# Patient Record
Sex: FEMALE
[De-identification: names, ages, dates, MRNs, and addresses within clinical notes are randomized per-mention and may not be internally consistent; named-entity substitution may affect disease eponyms.]

---

## 2020-12-10 ENCOUNTER — RESULTS ONLY (OUTPATIENT)
Dept: LAB | Age: 30
End: 2020-12-10

## 2020-12-10 ENCOUNTER — OFFICE VISIT (OUTPATIENT)
Dept: NEUROLOGY | Facility: CLINIC | Age: 30
End: 2020-12-10

## 2020-12-10 DIAGNOSIS — Z00.6 EXAMINATION OF PARTICIPANT OR CONTROL IN CLINICAL RESEARCH: Primary | ICD-10-CM

## 2020-12-10 DIAGNOSIS — G71.02 FSHD (FACIOSCAPULOHUMERAL MUSCULAR DYSTROPHY) (H): ICD-10-CM

## 2020-12-10 LAB
APTT PPP: 30 SEC (ref 22–37)
APTT PPP: NORMAL SEC (ref 22–37)
APTT PPP: NORMAL SEC (ref 22–37)
INR PPP: 0.95 (ref 0.86–1.14)
INR PPP: NORMAL (ref 0.86–1.14)
INR PPP: NORMAL (ref 0.86–1.14)
PLATELET # BLD AUTO: 224 10E9/L (ref 150–450)
PLATELET # BLD AUTO: NORMAL 10E9/L (ref 150–450)

## 2020-12-10 PROCEDURE — 99207 PR NO CHARGE-RESEARCH SERVICE: CPT | Performed by: PSYCHIATRY & NEUROLOGY

## 2020-12-10 NOTE — LETTER
12/10/2020       RE: Marni Bell  [address] [facility] [id number]     Dear Colleague,    Thank you for referring your patient, Marni Bell, to the [facility] [place] at [facility]. Please see a copy of my visit note below.    Research Visit.     Muscle biopsy procedure note.     Safety labs   PTT normal  INR - normal  Platelets normal    After informed consent was obtained patient was placed in supine position. Biopsy site on the left vastus lateralis was selected. Lab values for platelets and PTT, INR were reviewed and were normal. The site of incision was marked. The site of muscle biopsy was prepped and draped in a sterile fashion. Lidocaine 2% 10 mls was used for local anesthesia. Incision 1 cm was achieved to the depth of the muscle fascia. The biopsy needle was then inserted and pushed to penetrate muscle tissue. Tissue was harvested after 2 passes. Small pieces of tissue were placed in the tissue culture media and placed on wet ice immediately.  A very small piece of skin was cut off the edge of the incision and placed in the tissue culture media.  Hemostasis was achieved by applying pressure. The edges of incision were brought together and steri strips were applied to keep the wound closed.   Dressing with Bacitracin was applied with pressure using wrapping tape.      Blood loss - 3 mls.  Patient tolerated procedure well.  Instruction about wound care were provided at the discharged.     Guero Chapman MD      Again, thank you for allowing me to participate in the care of your patient.      Sincerely,    Guero Chapman MD

## 2020-12-10 NOTE — PROGRESS NOTES
Research Visit.     Muscle biopsy procedure note.     Safety labs   PTT normal  INR - normal  Platelets normal    After informed consent was obtained patient was placed in supine position. Biopsy site on the left vastus lateralis was selected. Lab values for platelets and PTT, INR were reviewed and were normal. The site of incision was marked. The site of muscle biopsy was prepped and draped in a sterile fashion. Lidocaine 2% 10 mls was used for local anesthesia. Incision 1 cm was achieved to the depth of the muscle fascia. The biopsy needle was then inserted and pushed to penetrate muscle tissue. Tissue was harvested after 2 passes. Small pieces of tissue were placed in the tissue culture media and placed on wet ice immediately.  A very small piece of skin was cut off the edge of the incision and placed in the tissue culture media.  Hemostasis was achieved by applying pressure. The edges of incision were brought together and steri strips were applied to keep the wound closed.   Dressing with Bacitracin was applied with pressure using wrapping tape.      Blood loss - 3 mls.  Patient tolerated procedure well.  Instruction about wound care were provided at the discharged.     Guero Chapman MD